# Patient Record
Sex: MALE
[De-identification: names, ages, dates, MRNs, and addresses within clinical notes are randomized per-mention and may not be internally consistent; named-entity substitution may affect disease eponyms.]

---

## 2018-04-06 ENCOUNTER — HOSPITAL ENCOUNTER (EMERGENCY)
Dept: HOSPITAL 97 - ER | Age: 28
Discharge: HOME | End: 2018-04-06
Payer: COMMERCIAL
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
## 2018-04-06 DIAGNOSIS — Z88.0: ICD-10-CM

## 2018-04-06 DIAGNOSIS — F32.9: ICD-10-CM

## 2018-04-06 DIAGNOSIS — S02.2XXA: Primary | ICD-10-CM

## 2018-04-06 DIAGNOSIS — Z23: ICD-10-CM

## 2018-04-06 PROCEDURE — 99284 EMERGENCY DEPT VISIT MOD MDM: CPT

## 2018-04-06 PROCEDURE — 70486 CT MAXILLOFACIAL W/O DYE: CPT

## 2018-04-06 PROCEDURE — 76377 3D RENDER W/INTRP POSTPROCES: CPT

## 2018-04-06 PROCEDURE — 90714 TD VACC NO PRESV 7 YRS+ IM: CPT

## 2018-04-06 PROCEDURE — 70450 CT HEAD/BRAIN W/O DYE: CPT
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
